# Patient Record
Sex: FEMALE | Race: OTHER
[De-identification: names, ages, dates, MRNs, and addresses within clinical notes are randomized per-mention and may not be internally consistent; named-entity substitution may affect disease eponyms.]

---

## 2020-12-08 ENCOUNTER — HOSPITAL ENCOUNTER (OUTPATIENT)
Dept: HOSPITAL 72 - GAS | Age: 65
Discharge: HOME | End: 2020-12-08
Payer: COMMERCIAL

## 2020-12-08 VITALS — DIASTOLIC BLOOD PRESSURE: 89 MMHG | SYSTOLIC BLOOD PRESSURE: 128 MMHG

## 2020-12-08 VITALS — SYSTOLIC BLOOD PRESSURE: 114 MMHG | DIASTOLIC BLOOD PRESSURE: 81 MMHG

## 2020-12-08 VITALS — SYSTOLIC BLOOD PRESSURE: 109 MMHG | DIASTOLIC BLOOD PRESSURE: 77 MMHG

## 2020-12-08 VITALS — SYSTOLIC BLOOD PRESSURE: 129 MMHG | DIASTOLIC BLOOD PRESSURE: 77 MMHG

## 2020-12-08 VITALS — WEIGHT: 200 LBS | HEIGHT: 63 IN | BODY MASS INDEX: 35.44 KG/M2

## 2020-12-08 VITALS — SYSTOLIC BLOOD PRESSURE: 141 MMHG | DIASTOLIC BLOOD PRESSURE: 85 MMHG

## 2020-12-08 VITALS — SYSTOLIC BLOOD PRESSURE: 137 MMHG | DIASTOLIC BLOOD PRESSURE: 89 MMHG

## 2020-12-08 VITALS — SYSTOLIC BLOOD PRESSURE: 125 MMHG | DIASTOLIC BLOOD PRESSURE: 76 MMHG

## 2020-12-08 VITALS — DIASTOLIC BLOOD PRESSURE: 83 MMHG | SYSTOLIC BLOOD PRESSURE: 132 MMHG

## 2020-12-08 DIAGNOSIS — E11.9: ICD-10-CM

## 2020-12-08 DIAGNOSIS — M79.7: ICD-10-CM

## 2020-12-08 DIAGNOSIS — K44.9: ICD-10-CM

## 2020-12-08 DIAGNOSIS — I10: ICD-10-CM

## 2020-12-08 DIAGNOSIS — Z90.49: ICD-10-CM

## 2020-12-08 DIAGNOSIS — M19.90: ICD-10-CM

## 2020-12-08 DIAGNOSIS — R10.9: Primary | ICD-10-CM

## 2020-12-08 DIAGNOSIS — E66.9: ICD-10-CM

## 2020-12-08 DIAGNOSIS — K21.9: ICD-10-CM

## 2020-12-08 DIAGNOSIS — E78.5: ICD-10-CM

## 2020-12-08 PROCEDURE — 82962 GLUCOSE BLOOD TEST: CPT

## 2020-12-08 PROCEDURE — 43239 EGD BIOPSY SINGLE/MULTIPLE: CPT

## 2020-12-08 PROCEDURE — 94150 VITAL CAPACITY TEST: CPT

## 2020-12-08 PROCEDURE — 94003 VENT MGMT INPAT SUBQ DAY: CPT

## 2020-12-08 NOTE — SHORT STAY SURGERY H&P
History of Present Illness


History of Present Illness


Chief Complaint


Abdominal pains/GERDs.


LATISHA Cohen is a 65 year old female who was admitted on  for Abdominal 

Pain/Gerds





Patient History


PAST MEDICAL HISTORY:  


(1) Diabetes


(2) Hypertension


(3) Carpal tunnel syndrome


(4) Irritable bowel disease


(5) History of cholecystectomy





Review of Systems


Cardiovascular:  Reports: hypertension


Respiratory:  Reports: no symptoms


Skeletal:  Reports: trauma


Gastrointestinal:  Reports: gastro esophageal reflux disease


Genitourinary:  Reports: no symptoms


Neurologic:  Reports: no symptoms


Endocrine:  Reports: no symptoms


Hematologic:  Reports: no symptoms





Physical Exam


Skin:  normal


HENT:  normal


Heart:  normal


Lungs:  normal


Abdomen:  abnormal


Extremities:  normal


Genitourinary:  normal





Plan


Plan of Care


Upper GI. endoscopy and biopsy.


Preop Interventions


None.


Summary of Findings


See the reports.


Attestation


Are the patient's medical conditions optimized for surgery?


Attestation Response:  yes











Ceci Farris MD                  Dec 8, 2020 07:47

## 2020-12-08 NOTE — ANETHESIA PREOPERATIVE EVAL
Anesthesia Pre-op PMH/ROS


General


Date of Evaluation:  Dec 8, 2020


Time of Evaluation:  07:02


Anesthesiologist:  rosa


ASA Score:  ASA 3


Mallampati Score


Class I : Soft palate, uvula, fauces, pillars visible


Class II: Soft palate, uvula, fauces visible


Class III: Soft palate, base of uvula visible


Class IV: Only hard plate visible


Mallampati Classification:  Class II


Surgeon:  carlton


Diagnosis:  abdominal pain


Surgical Procedure:  egd


Anesthesia History:  none


Social History:  smoking - former smoker


Family History:  no anesthesia problems


Allergies:  


Coded Allergies:  


     No Known Allergies (Unverified , 12/8/20)


Medications:  see eMAR


Patient NPO?:  Yes





Past Medical History


Cardiovascular:  Reports: HTN


Pulmonary:  Reports: asthma, other - bronchitis, seasonal allergies


Gastrointestinal/Genitourinary:  Reports: other - diarrhea, ibs


Endocrine:  Reports: DM


HEENT:  Reports: other - decreased visual acuity, partial upper dentures


Musculoskeletal/Integumentary:  Reports: other - fibromyalgia, back pain, joint 

pain, cts


Other:  obesity


PSxH Narrative:


lap vidal, btl





Anesthesia Pre-op Phys. Exam


Physician Exam


Constitutional:  NAD


Neurologic:  CN 2-12 intact


Cardiovascular:  RRR


Respiratory:  CTA


Gastrointestinal:  S/NT/ND





Airway Exam


Mallampati Score:  Class II


MO:  limited


Neck:  short


TMD:  2fb


ROM:  limited


Dentures:  upper





Anesthesia Pre-op A/P


Labs





Microbiology








 Date/Time


Source Procedure


Growth Status





 


 12/4/20 10:40


Nasopharynx SARS-CoV-2 RdRp Gene Assay - Final Complete











Risk Assessment & Plan


Assessment:


asa3


Plan:


mac


Status Change Before Surgery:  No





Pre-Antibiotics


Drug:  Carmen Sims MD           Dec 8, 2020 07:05

## 2020-12-08 NOTE — PRE-PROCEDURE NOTE/ATTESTATION
Pre-Procedure Note/Attestation


Complete Prior to Procedure


Planned Procedure:  left


Procedure Narrative:


Examination of the upper GI. tract via endoscopy.





Indications for Procedure


Pre-Operative Diagnosis:


R/O Peptic ulcer/gastritis/esophagitis.





Attestation


I attest that I discussed the nature of the procedure; its benefits; risks and 

complications; and alternatives (and the risks and benefits of such 

alternatives), prior to the procedure, with the patient (or the patient's legal 

representative).





I attest that, if there was a reasonable possibility of needing a blood 

transfusion, the patient (or the patient's legal representative) was given the 

[place] Department [employer] standardized written summary, pursuant 

to the [employer] [place] Blood Safety Act ([place] Health and Safety Code # 1645, as 

amended).





I attest that I re-evaluated the patient just prior to the surgery and that the

re has been no change in the patient's H&P, except as documented below:











Ceci Farris MD                  Dec 8, 2020 07:48

## 2020-12-08 NOTE — PRE-OP HX & PHY REPO 2 SIG
DATE OF ADMISSION:  12/08/2020

HISTORY OF PRESENT ILLNESS:  The patient is a 65-year-old 

non-English speaking female who is being seen at this time prior to

undergoing the procedure for upper GI endoscopy for which she has been

scheduled to receive for evaluation of her past history of gastritis and

Helicobacter infection that she has been diagnosed but the status of the

treatment is not clear.  The patient was seen by me approximately 3 months

ago when I examined her and at that time she has been only referred for

one examination and performance of upper GI endoscopy for further

evaluation of possibility of presence of Helicobacter infection that she

has had in the past.



At this time, I am the examining the patient for her to undergo the

procedure of upper GI endoscopy today.  She reports to me that she still

is having epigastric pain and discomfort with the symptoms of

gastroesophageal acid reflux that she has had in the past.  She is a

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

injuries over her shoulders for which she received multiple medications

including nonsteroidal anti-inflammatory agents that caused the appearance

of her GI symptoms that she is complaining at this time as well.  It is

important to mention that she does suffer from underlying significant

obesity.  The patient has been followed by her primary caregiver at [facility]; however, she has never been started on any treatment for the

control of acid reflux that she has been having.  She also reports to me

that she never has any problem with swallowing and she has never

experienced any upper or lower GI bleeding either.  She however does have

symptoms of chronic diarrhea for which she has been diagnosed to have the

diagnosis of irritable bowel syndrome, IBS.  She also denies having any

other GI symptoms at this point of time except the abdominal pain mostly

located over the upper part of the abdomen, though she does have pain to

some extent in the lower part as well.



PAST MEDICAL HISTORY:  Basically, she has diagnosis of hypertension,

hyperlipidemia in the past along with diabetes mellitus, arthritis, and

asthma.



PAST SURGICAL HISTORY:  The patient has had history of carpal tunnel

surgery and surgery for the right  wrist, and cholecystectomy for

gallstones.



ALLERGIES:  None.



HABITS:  She does not smoke or drink alcohol.



FAMILY HISTORY:  None significant.



REVIEW OF SYSTEMS:  Basically history of present illness, otherwise

completely negative.



PHYSICAL EXAMINATION:

GENERAL:  At this time reveals alert, well-oriented female, who does not

seem to be in acute distress.  She looks excessively obese, answers the

questions quite properly.

VITAL SIGNS:  Blood pressure 129/77, temperature 97.8, pulse rate 72,

respiratory rate 18 per minute, oxygen saturation 97% on room air.

HEENT:  Normocephalic.  Pupils are equal in size and reactive to light and

accommodation.  No visible jaundice.  Buccal cavity, tongue midline, well

hydrated.  No ulcers.

NECK:  Supple.  No JVD, thyromegaly, or adenopathy.

CHEST:  Clear to auscultation and percussion.  No rales or rhonchi.

HEART:  S1 and S2 normal.  Regular rhythm.  No gallops or murmur.

ABDOMEN:  Soft but quite obese.  There area areas of tenderness mostly over

the upper abdomen, but there are areas of mild tenderness over the lower

part as well.  There is no organomegaly.  No palpable mass.

EXTREMITIES:  Within normal limits.

CENTRAL NERVOUS SYSTEM:  Grossly normal.



PREOPERATIVE IMPRESSION:

1. Epigastric pain with chronic gastroesophageal reflux, rule out

underlying gastritis, peptic ulcer disease with history of Helicobacter

pylori infection, questionable status of this infection.

2. Generalized abdominal pain with diarrhea consistent with underlying

irritable bowel syndrome.

3. Diabetes mellitus, hypertension, hyperlipidemia, and history of

asthma.

4. History of bodily injury, work-related.



RECOMMENDATIONS:  The patient at this time seems to be quite stable to

undergo the procedure for upper GI endoscopy for which she has been

scheduled.  She understands the risks and benefits and will sign the

consent.









  ______________________________________________

  Said TRI Farris





DR:  Kendra

D:  12/08/2020 08:52

T:  12/08/2020 09:06

JOB#:  [id number]

CC:

## 2020-12-08 NOTE — DISCHARGE INSTRUCTIONS
Discharge Instructions


Discharge Instructions


Follow up with:  Ptient does not need to follow with this doctor in office





For Congestive Heart Failure


Reminder


Report to your physician any weight gain of 5 pounds or more in one week.











Ceci Farris MD                  Dec 8, 2020 07:49

## 2020-12-08 NOTE — IMMEDIATE POST-OP EVALUATION
Immediate Post-Op Evalulation


Immediate Post-Op Evalulation


Procedure:  egd w/bx


Date of Evaluation:  Dec 8, 2020


Time of Evaluation:  08:59


IV Fluids:  200ml lr


Blood Products:  none


Estimated Blood Loss:  negligible


Blood Pressure Systolic:  109


Blood Pressure Diastolic:  77


Pulse Rate:  79


Respiratory Rate:  18


O2 Sat by Pulse Oximetry:  100


Temperature (Fahrenheit):  98.2


Pain Score (1-10):  0


Nausea:  No


Vomiting:  No


Complications


none


Patient Status:  awake, reacts, patent


Hydration Status:  adequate


Drug:  Carmen Sims MD           Dec 8, 2020 09:00

## 2020-12-08 NOTE — ENDOSCOPY PROCEDURE NOTE
Endoscopy Procedure Note


General


Indication for Procedure:  Abdominal pains/GERDs


Procedures Performed:  EGD - Small sliding hiatal hernia noted otherwise 

completely normal upper GI. endoscopy; biopsy done per random from gastric mid 

body.


Specimen:  yes


Estimated Blood Loss:  none





Anesthesia


Anesthesiologist:  Dr. Rey


Anesthesia:  moderate sedation





Medications


Medication Given:  see anesthesia record





Inserted Devices


Implant(s) used?:  No





Quality


Quality of Bowel Preparation:  Excellent


Was there any complications?:  No





GI Core Measures


50 yrs or older w/o bx or poly:  Not Applicable


10yrs. F/U recommended:  Not Applicable


If not recommended, why?:  


Med reason:<3 yrs.:  


System Reason:<3 yrs.:  











Ceci Farris MD                  Dec 8, 2020 08:42

## 2020-12-08 NOTE — OPERATIVE NOTE - DICTATED
DATE OF OPERATION:  12/08/2020

SURGEON:  Ceci Farris MD.



PROCEDURE:  Esophagogastroduodenoscopy with biopsy.



PREOPERATIVE DIAGNOSES:  Abdominal pain, history of chronic

gastroesophageal reflux, and history of Helicobacter pylori gastritis,

question of status of infection, rule out peptic ulcer disease induced by

nonsteroidal anti-inflammatory agents/esophagitis.



POSTOPERATIVE DIAGNOSIS:  A small sliding hiatal hernia, otherwise

completely normal upper GI endoscopy.  Biopsy was taken from mid body of

the stomach.



MEDICATION USED:  Per Dr. Sweeney, anesthesiologist.



INSTRUMENT:  GIF Olympus upper GI video endoscope.



DESCRIPTION OF PROCEDURE:  The patient after arriving in the endoscopy

unit, was told about risks and benefits of the procedure, which she

accepted and signed informed consent.  At this time, she was put in the

left lateral decubitus position.  After adequate IV sedation, the scope

was gently passed through the cricopharyngeal area, was lodged into the

upper esophagus and was gradually advanced towards gastroesophageal

junction.  The entire length of esophagus was completely normal without

any evidence of pathology such as tumors, stricture, polyps, etc.  GE

junction also looked normal.  No Terry's.  However, there was a very

small sliding hiatal hernia of no great significance.



At this time, the scope was advanced into the stomach.  Gastric cavity

was distended with insufflation of air and gradually the areas of the

fundus and the body and the antrum were examined, which revealed

completely normal mucosal coverage without any presence of ulcers, tumors,

polyps, bleeding site, etc.  One random biopsy from gastric mid body was

obtained and subsequently scope was passed through the pylorus.  First and

second portion of duodenum were found to be completely normal.  At this

time, the scope was pulled out and the procedure was terminated.  The

patient tolerated the procedure well and left the endoscopy room in a good

condition.









  ______________________________________________

  Ceci Farris M.D. DR:  MELINDA

D:  12/08/2020 08:54

T:  12/08/2020 09:10

JOB#:  [id number]

CC:

## 2020-12-08 NOTE — 48 HOUR POST ANESTHESIA EVAL
Post Anesthesia Evaluation


Procedure:  egd w/bx


Date of Evaluation:  Dec 8, 2020


Time of Evaluation:  09:01


Blood Pressure Systolic:  128


0:  89


Pulse Rate:  75


Respiratory Rate:  18


Temperature (Fahrenheit):  98.2


O2 Sat by Pulse Oximetry:  100


Airway:  patent


Nausea:  No


Vomiting:  No


Pain Intensity:  0


Hydration Status:  adequate


Cardiopulmonary Status:


stable


Mental Status/LOC:  patient returned to baseline


Post-Anesthesia Complications:


none


Follow-up care needed:  N/A











Carmen Hawkins MD           Dec 8, 2020 09:45